# Patient Record
Sex: FEMALE | Race: WHITE
[De-identification: names, ages, dates, MRNs, and addresses within clinical notes are randomized per-mention and may not be internally consistent; named-entity substitution may affect disease eponyms.]

---

## 2018-10-04 ENCOUNTER — HOSPITAL ENCOUNTER (EMERGENCY)
Dept: HOSPITAL 62 - ER | Age: 28
Discharge: HOME | End: 2018-10-04
Payer: MEDICAID

## 2018-10-04 VITALS — SYSTOLIC BLOOD PRESSURE: 102 MMHG | DIASTOLIC BLOOD PRESSURE: 59 MMHG

## 2018-10-04 DIAGNOSIS — Z88.6: ICD-10-CM

## 2018-10-04 DIAGNOSIS — Z91.040: ICD-10-CM

## 2018-10-04 DIAGNOSIS — G40.909: Primary | ICD-10-CM

## 2018-10-04 LAB
ADD MANUAL DIFF: NO
ANION GAP SERPL CALC-SCNC: 12 MMOL/L (ref 5–19)
BASOPHILS # BLD AUTO: 0 10^3/UL (ref 0–0.2)
BASOPHILS NFR BLD AUTO: 0.5 % (ref 0–2)
BUN SERPL-MCNC: 12 MG/DL (ref 7–20)
CALCIUM: 9.5 MG/DL (ref 8.4–10.2)
CHLORIDE SERPL-SCNC: 105 MMOL/L (ref 98–107)
CO2 SERPL-SCNC: 25 MMOL/L (ref 22–30)
EOSINOPHIL # BLD AUTO: 0.1 10^3/UL (ref 0–0.6)
EOSINOPHIL NFR BLD AUTO: 1.1 % (ref 0–6)
ERYTHROCYTE [DISTWIDTH] IN BLOOD BY AUTOMATED COUNT: 13.6 % (ref 11.5–14)
GLUCOSE SERPL-MCNC: 98 MG/DL (ref 75–110)
HCT VFR BLD CALC: 38.4 % (ref 36–47)
HGB BLD-MCNC: 13.2 G/DL (ref 12–15.5)
LYMPHOCYTES # BLD AUTO: 2.4 10^3/UL (ref 0.5–4.7)
LYMPHOCYTES NFR BLD AUTO: 27.2 % (ref 13–45)
MCH RBC QN AUTO: 29.6 PG (ref 27–33.4)
MCHC RBC AUTO-ENTMCNC: 34.4 G/DL (ref 32–36)
MCV RBC AUTO: 86 FL (ref 80–97)
MONOCYTES # BLD AUTO: 0.9 10^3/UL (ref 0.1–1.4)
MONOCYTES NFR BLD AUTO: 10 % (ref 3–13)
NEUTROPHILS # BLD AUTO: 5.4 10^3/UL (ref 1.7–8.2)
NEUTS SEG NFR BLD AUTO: 61.2 % (ref 42–78)
PLATELET # BLD: 241 10^3/UL (ref 150–450)
POTASSIUM SERPL-SCNC: 3.3 MMOL/L (ref 3.6–5)
RBC # BLD AUTO: 4.46 10^6/UL (ref 3.72–5.28)
SODIUM SERPL-SCNC: 141.8 MMOL/L (ref 137–145)
TOTAL CELLS COUNTED % (AUTO): 100 %
WBC # BLD AUTO: 8.8 10^3/UL (ref 4–10.5)

## 2018-10-04 PROCEDURE — 36415 COLL VENOUS BLD VENIPUNCTURE: CPT

## 2018-10-04 PROCEDURE — 80048 BASIC METABOLIC PNL TOTAL CA: CPT

## 2018-10-04 PROCEDURE — 99284 EMERGENCY DEPT VISIT MOD MDM: CPT

## 2018-10-04 PROCEDURE — 96374 THER/PROPH/DIAG INJ IV PUSH: CPT

## 2018-10-04 PROCEDURE — 85025 COMPLETE CBC W/AUTO DIFF WBC: CPT

## 2018-10-04 PROCEDURE — 83735 ASSAY OF MAGNESIUM: CPT

## 2018-10-04 NOTE — ER DOCUMENT REPORT
ED General





- General


Chief Complaint: Seizure


Stated Complaint: POSSIBLE SEIZURE


Time Seen by Provider: 10/04/18 02:40


Notes: 





Patient is a 28-year-old female who is brought in for seizures.  She has a 

history of seizure disorder but apparently does not take anything for her 

seizures.  She apparently had a few seizures at home and a months was called.  

Paramedics got there she was not seizing at that time but then started having a 

seizure in route here.  They gave her 2.5 mg of Versed IV which stopped the 

seizure.  She is now postictal and somnolent.  She cannot answer any questions 

at this time.





- Related Data


Allergies/Adverse Reactions: 


 





codeine Allergy (Verified 10/04/18 02:32)


 


latex Allergy (Verified 10/04/18 02:32)


 


metoclopramide [From Reglan] Allergy (Verified 10/04/18 02:32)


 


morphine Allergy (Verified 10/04/18 02:32)


 


ondansetron [From Zofran] Allergy (Verified 10/04/18 02:32)


 











Past Medical History





- Social History


Smoking Status: Unknown if Ever Smoked


Frequency of alcohol use: unknown


Drug Abuse: Other - unknown


Family History: Reviewed & Not Pertinent





Review of Systems





- Review of Systems


-: Yes ROS unobtainable due to patient's medical condition - Patient is 

postictal.





Physical Exam





- Vital signs


Vitals: 


 











Pulse Ox


 


 98 


 


 10/04/18 02:25














- Notes


Notes: 





General Appearance: Currently somnolent but is arousable.  She will open her 

eyes for brief period time look around.  When I asked her questions she will 

mumble some answers but the mumbling is incoherent.


Vitals: reviewed, See vital signs table.


Head: no swelling or tenderness to the head


Eyes: PERRL, EOMI, Conjuctiva clear


Mouth: No decreasd moisture.  No teeth.


Throat: No tonsillar inflammation, No airway obstruction,  No lymphadenopathy


Neck: Supple, no neck tenderness, No thyromegaly


Lungs: No wheezing, No rales, No rhonci, No accessory muscle use, good air 

exchange bilaterally.


Heart: Normal rate, Regular rythm, No murmur, no rub


Abdomen: Normal BS, soft, No rigidity, No abdominal tenderness, No guarding, no 

rebound, no abdominal masses, no organomegaly


Extremities:  good pulses in all extremities, no swelling or tenderness in the 

extremities, no edema.


Skin: warm, dry, appropriate color, no rash


Neuro: Somnolent.  When I do arouse patient she does move her extremities on 

her own.  When I went to open her eyelids she lifted her hand and push my hand 

away.





Course





- Re-evaluation


Re-evalutation: 





10/04/18 04:11


Patient still sleepy from the Versed but she is now able to wake up and tell me 

more information.  She says that she does have history of seizure disorder.  

She does not see a doctor for this and therefore is not on any medications for 

her seizure.  Her friend has arrived.  He says he is unsure if she takes 

anything either.  I will give her a of Keppra IV to help prevent further 

seizure activity.  Patient's friend said that she had multiple seizures today.  

He says he has known her for several months and she will have 1 or 2 seizures 

but today she had approximately 5 seizures and that is why they called the 

ambulance.


10/04/18 05:19








She is awake alert answers all questions appropriately.  I talked her length 

about the need to follow with a neurologist.  She is visiting here from 

[place] with Mercedes.  She says that she does not follow with a neurologist 

because there is multiple people in her family to have seizures" they know what 

to do".  I informed her that she should be on seizure medications because 

otherwise she may have prolonged seizures at the cannot stop and this could be 

deadly.  I have prescribed her Keppra.  I informed her that she is welcome to 

return to ER anytime as were happy to reassess her any time and definitely she 

should return if she has recurrent seizures.  Patient agrees with plan will be 

discharged home.





Dictation of this chart was performed using voice recognition software; 

therefore, there may be some unintended grammatical errors.





- Vital Signs


Vital signs: 


 











Temp Pulse Resp BP Pulse Ox


 


       20   113/68   97 


 


       10/04/18 02:30  10/04/18 02:30  10/04/18 02:30














- Laboratory


Result Diagrams: 


 10/04/18 02:45





 10/04/18 02:45


Laboratory results interpreted by me: 


 











  10/04/18





  02:45


 


Potassium  3.3 L














Discharge





- Discharge


Clinical Impression: 


 Seizure





Condition: Good


Disposition: HOME, SELF-CARE


Additional Instructions: 


Seizure, Known Epileptic





     You have had a seizure.  Seizures may "break through" in an epileptic due 

to stress of infection or injury, a change in blood chemistry, or drug and 

alcohol use.  Another common cause is failure to take medication as prescribed.


     Your doctor has evaluated your situation for the likely cause of this 

seizure.  It is important that you follow his advice concerning any medication 

changes and follow-up care.  Further testing of anti-seizure medication levels 

in your blood may be necessary.


     If you have a 's license, it's important that you DO NOT DRIVE until 

given permission by your physician.  This seizure must be reported to the 

's license bureau.


     Call the doctor or return if seizures recur, or if new or unusual symptoms 

arise -- such as severe headache, confusion, excessive sleepiness, local 

weakness or numbness, neck stiffness, or fever.








Please take the  Keppra as prescribed to help prevent seizure. Please follow up 

with the neurologist, Dr. Crump. Please return to the ER if you have 

recurrent seizures.


Prescriptions: 


Levetiracetam [Keppra 500 mg Tablet] 500 mg PO Q12 #60 tablet


Referrals: 


YOLANDA CRUMP MD [NO LOCAL MD] - Follow up in 3-5 days